# Patient Record
Sex: MALE | Race: WHITE
[De-identification: names, ages, dates, MRNs, and addresses within clinical notes are randomized per-mention and may not be internally consistent; named-entity substitution may affect disease eponyms.]

---

## 2017-07-22 ENCOUNTER — HOSPITAL ENCOUNTER (EMERGENCY)
Dept: HOSPITAL 18 - NAV ERS | Age: 52
Discharge: HOME | End: 2017-07-22
Payer: COMMERCIAL

## 2017-07-22 DIAGNOSIS — Z79.4: ICD-10-CM

## 2017-07-22 DIAGNOSIS — S70.11XA: ICD-10-CM

## 2017-07-22 DIAGNOSIS — F17.210: ICD-10-CM

## 2017-07-22 DIAGNOSIS — E11.9: ICD-10-CM
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
## 2017-07-22 DIAGNOSIS — S23.41XA: Primary | ICD-10-CM

## 2017-07-22 DIAGNOSIS — Z79.899: ICD-10-CM

## 2017-07-22 LAB
ALBUMIN SERPL BCG-MCNC: 4.6 G/DL (ref 3.5–5)
ALP SERPL-CCNC: 53 U/L (ref 40–150)
ALT SERPL W P-5'-P-CCNC: 37 U/L (ref 8–55)
ANION GAP SERPL CALC-SCNC: 17 MMOL/L (ref 10–20)
AST SERPL-CCNC: 26 U/L (ref 5–34)
BASOPHILS # BLD AUTO: 0.1 THOU/UL (ref 0–0.2)
BASOPHILS NFR BLD AUTO: 1 % (ref 0–1)
BILIRUB SERPL-MCNC: 0.4 MG/DL (ref 0.2–1.2)
BUN SERPL-MCNC: 11 MG/DL (ref 8.4–25.7)
CALCIUM SERPL-MCNC: 9.8 MG/DL (ref 7.8–10.44)
CHLORIDE SERPL-SCNC: 104 MMOL/L (ref 98–107)
CO2 SERPL-SCNC: 21 MMOL/L (ref 22–29)
CREAT CL PREDICTED SERPL C-G-VRATE: 0 ML/MIN (ref 70–130)
EOSINOPHIL # BLD AUTO: 0.1 THOU/UL (ref 0–0.7)
EOSINOPHIL NFR BLD AUTO: 0.8 % (ref 0–10)
GLOBULIN SER CALC-MCNC: 2.7 G/DL (ref 2.4–3.5)
GLUCOSE SERPL-MCNC: 169 MG/DL (ref 70–105)
HGB BLD-MCNC: 14.9 G/DL (ref 14–18)
LYMPHOCYTES # BLD AUTO: 3.1 THOU/UL (ref 1.2–3.4)
LYMPHOCYTES NFR BLD AUTO: 30.3 % (ref 21–51)
MCH RBC QN AUTO: 30.9 PG (ref 27–31)
MCV RBC AUTO: 90.2 FL (ref 80–94)
MONOCYTES # BLD AUTO: 0.8 THOU/UL (ref 0.11–0.59)
MONOCYTES NFR BLD AUTO: 7.3 % (ref 0–10)
NEUTROPHILS # BLD AUTO: 6.3 THOU/UL (ref 1.4–6.5)
NEUTROPHILS NFR BLD AUTO: 60.6 % (ref 42–75)
PLATELET # BLD AUTO: 271 THOU/UL (ref 130–400)
POTASSIUM SERPL-SCNC: 4 MMOL/L (ref 3.5–5.1)
RBC # BLD AUTO: 4.81 MILL/UL (ref 4.7–6.1)
SODIUM SERPL-SCNC: 138 MMOL/L (ref 136–145)
WBC # BLD AUTO: 10.3 THOU/UL (ref 4.8–10.8)

## 2017-07-22 PROCEDURE — 96374 THER/PROPH/DIAG INJ IV PUSH: CPT

## 2017-07-22 PROCEDURE — 96375 TX/PRO/DX INJ NEW DRUG ADDON: CPT

## 2017-07-22 PROCEDURE — 80053 COMPREHEN METABOLIC PANEL: CPT

## 2017-07-22 PROCEDURE — 85025 COMPLETE CBC W/AUTO DIFF WBC: CPT

## 2017-07-22 PROCEDURE — 74177 CT ABD & PELVIS W/CONTRAST: CPT

## 2017-07-22 PROCEDURE — 71260 CT THORAX DX C+: CPT

## 2017-07-22 NOTE — RAD
RIGHT FEMUR TWO VIEWS

7/22/17

 

HISTORY: 

Fall from ladder. Right leg injury. 

 

FINDINGS:  

Tiny well corticated ossification just anterior to the greater trochanter on the lateral view may re
present an old ossific avulsion or heterotopic ossification. There are mild degenerative changes of 
the hip. No acute fracture or dislocation are apparent. 

 

IMPRESSION:  

No acute osseous abnormalities are demonstrated. 

 

POS: RENARD

## 2017-07-22 NOTE — CT
CT CHEST WITH IV CONTRAST

CT ABDOMEN AND PELVIS WITH IV CONTRAST 

CT THORACIC SPINE NONCONTRAST

CT LUMBAR SPINE NONCONTRAST

7/22/17

 

HISTORY: 

Fall from ladder. Chest injury. Abdomen injury. Back injury.

 

FINDINGS:  

There is no evidence of pneumothorax, pleural fluid or mediastinal hematoma. Calcifications apparent
 within the coronary arteries. The liver, spleen, right kidney, adrenal glands, and pancreas are wit
hin normal limits. Large cysts projecting anteriorly from the superior pole of the left kidney. Post
operative changes of the sigmoid colon are apparent. Minimal physiologic wedging of the L1 vertebral
 body has the appearance of an old process. Vertebral body height and alignment otherwise are mainta
ined with scattered osteophytosis. No acute fracture or dislocation are evident. 

 

IMPRESSION:  

1.      No acute traumatic injury is demonstrated. 

2.      Atherosclerosis. 

 

POS: RENARD

## 2018-10-07 ENCOUNTER — HOSPITAL ENCOUNTER (EMERGENCY)
Dept: HOSPITAL 18 - NAV ERS | Age: 53
Discharge: ANOTHER HOSPITAL | End: 2018-10-07
Payer: COMMERCIAL

## 2018-10-07 ENCOUNTER — HOSPITAL ENCOUNTER (INPATIENT)
Dept: HOSPITAL 92 - ERS | Age: 53
LOS: 2 days | Discharge: HOME | DRG: 193 | End: 2018-10-09
Attending: INTERNAL MEDICINE | Admitting: INTERNAL MEDICINE
Payer: COMMERCIAL

## 2018-10-07 VITALS — BODY MASS INDEX: 29 KG/M2

## 2018-10-07 DIAGNOSIS — Z79.4: ICD-10-CM

## 2018-10-07 DIAGNOSIS — E11.9: ICD-10-CM

## 2018-10-07 DIAGNOSIS — J96.01: ICD-10-CM

## 2018-10-07 DIAGNOSIS — J10.1: Primary | ICD-10-CM

## 2018-10-07 DIAGNOSIS — J09.X2: Primary | ICD-10-CM

## 2018-10-07 DIAGNOSIS — J44.1: ICD-10-CM

## 2018-10-07 DIAGNOSIS — I10: ICD-10-CM

## 2018-10-07 DIAGNOSIS — K58.9: ICD-10-CM

## 2018-10-07 DIAGNOSIS — Z79.899: ICD-10-CM

## 2018-10-07 DIAGNOSIS — F17.210: ICD-10-CM

## 2018-10-07 LAB
ALBUMIN SERPL BCG-MCNC: 4.7 G/DL (ref 3.5–5)
ALP SERPL-CCNC: 52 U/L (ref 40–150)
ALT SERPL W P-5'-P-CCNC: 19 U/L (ref 8–55)
ANION GAP SERPL CALC-SCNC: 18 MMOL/L (ref 10–20)
AST SERPL-CCNC: 20 U/L (ref 5–34)
BASOPHILS # BLD AUTO: 0.1 THOU/UL (ref 0–0.2)
BASOPHILS NFR BLD AUTO: 0.8 % (ref 0–1)
BILIRUB SERPL-MCNC: 0.6 MG/DL (ref 0.2–1.2)
BUN SERPL-MCNC: 11 MG/DL (ref 8.4–25.7)
CALCIUM SERPL-MCNC: 9.6 MG/DL (ref 7.8–10.44)
CHLORIDE SERPL-SCNC: 103 MMOL/L (ref 98–107)
CK MB SERPL-MCNC: 3.9 NG/ML (ref 0–6.6)
CO2 SERPL-SCNC: 19 MMOL/L (ref 22–29)
CREAT CL PREDICTED SERPL C-G-VRATE: 0 ML/MIN (ref 70–130)
EOSINOPHIL # BLD AUTO: 0 THOU/UL (ref 0–0.7)
EOSINOPHIL NFR BLD AUTO: 0.2 % (ref 0–10)
GLOBULIN SER CALC-MCNC: 2.9 G/DL (ref 2.4–3.5)
GLUCOSE SERPL-MCNC: 143 MG/DL (ref 70–105)
HGB BLD-MCNC: 16.4 G/DL (ref 14–18)
LYMPHOCYTES # BLD AUTO: 1.7 THOU/UL (ref 1.2–3.4)
LYMPHOCYTES NFR BLD AUTO: 13.6 % (ref 21–51)
MCH RBC QN AUTO: 29.8 PG (ref 27–31)
MCV RBC AUTO: 92.3 FL (ref 78–98)
MONOCYTES # BLD AUTO: 0.8 THOU/UL (ref 0.11–0.59)
MONOCYTES NFR BLD AUTO: 6.5 % (ref 0–10)
NEUTROPHILS # BLD AUTO: 10 THOU/UL (ref 1.4–6.5)
NEUTROPHILS NFR BLD AUTO: 78.9 % (ref 42–75)
PLATELET # BLD AUTO: 301 THOU/UL (ref 130–400)
POTASSIUM SERPL-SCNC: 3.7 MMOL/L (ref 3.5–5.1)
RBC # BLD AUTO: 5.5 MILL/UL (ref 4.7–6.1)
SODIUM SERPL-SCNC: 136 MMOL/L (ref 136–145)
TROPONIN I SERPL DL<=0.01 NG/ML-MCNC: (no result) NG/ML (ref ?–0.03)
WBC # BLD AUTO: 12.7 THOU/UL (ref 4.8–10.8)

## 2018-10-07 PROCEDURE — 36416 COLLJ CAPILLARY BLOOD SPEC: CPT

## 2018-10-07 PROCEDURE — 87804 INFLUENZA ASSAY W/OPTIC: CPT

## 2018-10-07 PROCEDURE — 80053 COMPREHEN METABOLIC PANEL: CPT

## 2018-10-07 PROCEDURE — 83880 ASSAY OF NATRIURETIC PEPTIDE: CPT

## 2018-10-07 PROCEDURE — 71275 CT ANGIOGRAPHY CHEST: CPT

## 2018-10-07 PROCEDURE — 85379 FIBRIN DEGRADATION QUANT: CPT

## 2018-10-07 PROCEDURE — 85025 COMPLETE CBC W/AUTO DIFF WBC: CPT

## 2018-10-07 PROCEDURE — 96374 THER/PROPH/DIAG INJ IV PUSH: CPT

## 2018-10-07 PROCEDURE — 83605 ASSAY OF LACTIC ACID: CPT

## 2018-10-07 PROCEDURE — 87040 BLOOD CULTURE FOR BACTERIA: CPT

## 2018-10-07 PROCEDURE — 94760 N-INVAS EAR/PLS OXIMETRY 1: CPT

## 2018-10-07 PROCEDURE — 94640 AIRWAY INHALATION TREATMENT: CPT

## 2018-10-07 PROCEDURE — 82553 CREATINE MB FRACTION: CPT

## 2018-10-07 PROCEDURE — 93005 ELECTROCARDIOGRAM TRACING: CPT

## 2018-10-07 PROCEDURE — 96361 HYDRATE IV INFUSION ADD-ON: CPT

## 2018-10-07 PROCEDURE — A4216 STERILE WATER/SALINE, 10 ML: HCPCS

## 2018-10-07 PROCEDURE — 84484 ASSAY OF TROPONIN QUANT: CPT

## 2018-10-07 PROCEDURE — 99406 BEHAV CHNG SMOKING 3-10 MIN: CPT

## 2018-10-07 NOTE — CT
CT ANGIOGRAM CHEST:

 

10/07/2018 

 

HISTORY:

Shortness of breath.  Assess for pulmonary embolism.  Coughing.

 

COMPARISON:

None.

 

TECHNIQUE:

Serial axial CT imaging at 2.5 mm intervals, from the thoracic inlet through the upper abdomen, with 
IV contrast, using a CT angiogram protocol.  Coronal and oblique sagittal 3D reformatted imaging obta
ined.

 

FINDINGS:

No axillary, mediastinal, or hilar lymphadenopathy is noted.  No pleural, pericardial, or mediastinal
 fluid is seen.

 

The imaged upper abdomen demonstrates a low density, incompletely imaged lesion, emanating from the u
pper pole of the left kidney, measuring 6 cm.  Recommend nonemergent follow-up renal ultrasound for f
ull assessment.

 

There are a few scattered atherosclerotic calcifications of the coronary arteries.

 

There is no central pulmonary arterial filling defect to suggest the presence of central pulmonary em
bolism.  Motion artifact limits detailed assessment of the distal pulmonary arteries.

 

There are scattered central lobular emphysematous changes noted within both lungs.

 

There are a few scattered, nonspecific, subcentimeter, pulmonary nodules seen within the right upper 
lobe, all measuring in the 3-4 mm range, including on image 26, 32, 34, and 35.  Small nodules are se
en along the course of the minor fissure, measuring up to 5 mm as well.  There is mild volume loss wi
thin the inferomedial aspect of the right middle lobe.

 

Subcentimeter pulmonary nodule noted in the left upper lobe anterolaterally, on image 39, measuring 4
 mm.  Similar nodule seen in the left upper lobe on image 43.

 

Review of the osseous structures demonstrates no worrisome lytic or blastic bone lesion.

 

There is a pulmonary nodule within the medial aspect of the left upper lobe on image 25, measuring 4 
mm.  Of note, these pulmonary nodules appear new when compared to a 07/22/2017 CT examination of the 
chest.

 

IMPRESSION:

1.  No evidence for pulmonary embolism.

 

2.  Nonspecific, small, new pulmonary nodules are noted bilaterally.  Recommend follow-up CT examinat
ion of the chest in 3-6 months.

 

3.  Hypodense lesion emanating from the left kidney, incompletely assessed.  Recommend further evalua
tion via nonemergent renal ultrasound.

 

CODE T

 

POS: RENARD

## 2018-10-08 RX ADMIN — INSULIN GLARGINE SCH MLS: 100 INJECTION, SOLUTION SUBCUTANEOUS at 10:10

## 2018-10-08 RX ADMIN — INSULIN LISPRO PRN UNIT: 100 INJECTION, SOLUTION INTRAVENOUS; SUBCUTANEOUS at 12:42

## 2018-10-08 RX ADMIN — INSULIN LISPRO PRN UNIT: 100 INJECTION, SOLUTION INTRAVENOUS; SUBCUTANEOUS at 16:38

## 2018-10-08 RX ADMIN — Medication SCH: at 08:33

## 2018-10-08 RX ADMIN — Medication SCH ML: at 20:36

## 2018-10-08 RX ADMIN — INSULIN HUMAN PRN UNIT: 100 INJECTION, SOLUTION PARENTERAL at 05:06

## 2018-10-08 RX ADMIN — INSULIN HUMAN PRN UNIT: 100 INJECTION, SOLUTION PARENTERAL at 20:39

## 2018-10-08 NOTE — PDOC.FPRHP
Addendum entered and electronically signed by Antoine Cary MD  10/08/18 03:34

: 





Home Medications


1. Proair 108 2 puff q4hr PRN


2. lantus 70 unit qam and 10 unit 1HS


3. lisinoprial 2.5 mg daily


4 hyoscyamine 0.125 mg q6hr PRN


5. Trulicirty 1.5 mg qweek


6. Viagria 100 mg 1 tab PRN


7. Combivent  mcg 2 puff q4hr PRN


8. humalog 5 unit SC QAC





Original Note:








- History of Present Illness


Chief Complaint: SOB


History of Present Illness: 





This is a 53 yo male with a PMH of DM II, IBS, COPD who presents to the ED from 

[place] with a CC of SOB. Pt. States that the symptoms began about 1 day PTA. 

Pt. Reports an associated cough that started dry and became productive after 

breathing treatment. He states he has never had anything like this before. He 

denies fevers or chills. He did report some soreness in his right shoulder that 

he states is from coughing.





- Allergies/Adverse Reactions


 Allergies











Allergy/AdvReac Type Severity Reaction Status Date / Time


 


No Known Drug Allergies Allergy Mild  Verified 10/08/18 03:43














- Home Medications


 











 Medication  Instructions  Recorded  Confirmed  Type


 


Dulaglutide [Trulicity] 1.5 mg SC Q7D 10/08/18 10/08/18 History


 


HumaLOG 5 unit SC AC PRN 10/08/18 10/08/18 History


 


Hyoscyamine Sulfate [Levsin SL] 0.25 mg SL Q6HR PRN 10/08/18 10/08/18 History


 


Insulin Glargine,Hum.Rec.Anlog 10 unit SQ QPM 10/08/18 10/08/18 History





[Lantus Solostar]    


 


Insulin Glargine,Hum.Rec.Anlog 70 unit SQ QAM 10/08/18 10/08/18 History





[Lantus Solostar]    


 


Lisinopril 2.5 mg PO DAILY 10/08/18 10/08/18 History














- History


PMHx:HTN, COPD, DMII, IBS


 


PSHx: Colectomy 2/2 diverticulitis, hiatal hernia surgery, ventral hernia repair





FHx: Mother COPD, father lung cancer 2/2 asbestos


 


Social: 40 pack year history, a few drinks 2-3 times a week, no drugs


 








- Review of Systems


General: denies: fever/chills, weight/appetite/sleep changes


Eyes: denies: eye pain, vision changes


ENT: denies: nasal congestion, rhinorrhea


Respiratory: reports: cough, shortness of breath.  denies: congestion


Cardiovascular: denies: chest pain, palpitation, edema


Gastrointestinal: denies: nausea, vomiting, diarrhea, constipation, abdominal 

pain


Genitourinary: denies: incontinence, dysuria


Skin: denies: rashes, lesions


Musculoskeletal: reports: pain (Shoulder pain)


Neurological: denies: numbness, syncope, seizure


Psychological: denies: anxiety, depression





- Vital signs


BP: 127/76  HR: 110 RR: 18 Tmax: 98.4 Pox: 93% on 2L  Wt: 88 kg   








- Physical Exam


Constitutional: NAD, awake, alert and oriented, well developed


HEENT: PERRLA, EOMI, MMM


Neck: supple, FROM, no JVD


Chest: no-tender to palpation, no lesions


Heart: RRR, normal S1/S2, no murmurs/rubs/gallops


Lungs: CTAB, no respiratory distress, good air movement, no wheezing


Abdomen: soft, non-tender, bowel sounds present, no masses/distention


Musculoskeletal: normal structure, normal tone, ROM grossly normal


Neurological: CN II-XII intact


Skin: good turgor, capillary refill <2 seconds


Heme/Lymphatic: no unusual bruising or bleeding


Psychiatric: normal mood and affect





FMR H&P: Results





- Labs


Lab results: 





Sodium 136, potassium 3.7, Chloride 103, bicarb 19, bun 11, cr 0.89, glucose 143

, lactic acid 1.8, Trop neg x1, BNP <10, WBC 12.7, Hgb 16.4, Hct 50.8, ptl 301, 

Influenza A positive.





- Radiology Interpretation


  ** CT scan - chest


Status: report reviewed by me (CTA: no PE, new, small pulmonary nodules 

bilaterally, hypodesne lestion emanating from left kidney)





FMR H&P: A/P





- Problem List


(1) Acute respiratory failure with hypoxia


Current Visit: Yes   Status: Acute   Code(s): J96.01 - ACUTE RESPIRATORY 

FAILURE WITH HYPOXIA   





(2) COPD exacerbation


Current Visit: Yes   Status: Acute   Code(s): J44.1 - CHRONIC OBSTRUCTIVE 

PULMONARY DISEASE W (ACUTE) EXACERBATION   





(3) DMII (diabetes mellitus, type 2)


Current Visit: Yes   Status: Acute   





(4) HTN (hypertension)


Current Visit: Yes   Status: Acute   Code(s): I10 - ESSENTIAL (PRIMARY) 

HYPERTENSION   





(5) IBS (irritable bowel syndrome)


Current Visit: Yes   Status: Acute   





- Plan





This is a 53 yo male with a PMH of DM II, IBS, COPD 





Acute hypoxic respiratory failure 2/2 COPD exacerbation 


-Pt. Is admitted to medical. We will provide supportive care and duonebs q4hr, 

abuterol q2hr PRN while pt. Is here. Pt has been started on oral steroids(10/8) 

as well as azithromycin (10/8). Flu swab was positive. We will start him on 

tamiflu and respiratory precautions 





DMII 


-Continue home meds. ACHS accuchecks, SSI 





HTN 


-Continue home meds 





IBS 


-Aware 





Code: Full 


Prophylaxis: lovenox 


Family: none at bedside 


Disposition: Home in 2-3 days 





FMR H&P: Upper Level





- Pertinent history





53 yo M PMH HTN, IDDM, COPD, and HLD. Presents as transfer from [place] ER with 

a CC of worsening SOB and CHILDERS that started approximately 24 hours ago. Reports 

worsening of symptoms. Also reports left sided shoulder pain that started at 

1300 today. Endorses cough. States neb treatments have help with symptoms but 

was still requiring oxygen. Was sent to [facility] for evaluation and admission. 





[place] ER: Labs, CTA-PE protocol, EKG, Solumedrol, duobneb x2, albuterol x1, NS 

1L. 


[place] [facility]: Duoneb x1, Tamiflu, albuterol x1, 











- Pertinent findings


Vitals: Remarkable for spo2 93% on 2L NC, otherwise WNL


GEN: mild respiratory distress with movement, speaks in full sentences, A&Ox4


CV: RRR, no murmur


Pulm: CTA-B, increased labor of breathing with minimal exertion


Skin: warm, dry and intact.





Labs: WBC 12.7, D-dimer 0.7, influenza A positive. 





Imaging: CTA-chest- negative PE, no infiltrates, pulmonary nodules, 





- Plan


Date/Time: 10/08/18 0209








I, Antoine Cary MD, have evaluated this patient and agree with findings/plan as 

outlined by intern resident. Pertinent changes/additions are listed here.





1. Acute Hypoxic respiratory distress 2/2 influenza A pneumonia vs COPD 

exacerbation: continue tamiflu, prednisone, scheduled Duoneb with PRN albuterol 

Neb, and azithromycin. oxygen as need to maintain saturation >92%. 


2. HTN: home meds


3. COPD: home meds


4. IDDM: home meds, SSI, ACHS checks, hypoglycemic protocol 


5. HLD: niranjan meds


6. Pulmonary nodule: outpatient CT follow up


7. Tobacco abuse: prn patch, cessation encouraged 


8. Diet: CC, HH,


9. PPx: SCD


10. CODE: FULL





Dispo: inpatient, medical, >2 midnights 





Discussed with Dr. Blanco. 





Attending Addendum





- Attending Addendum


Date/Time: 10/08/18 1530





I personally evaluated the patient and discussed the management with Dr. Hernandez


I agree with the History, Examination, Assessment and Plan documented above 

with any addition or exceptions noted below- Briefly this is a 53 yo male with h

/o COPD, DM, HTN who presented with 1 day h/o SOB worsening throughout the day. 

Mild nonproductive cough. Denies fever, sore throat, body aches. Wife just 

started to feel ill also. PMH/PSH/Meds/SH reviewed and agree with resident's 

documentation. Afebrile VSS. Exam repeated by me and agree with resident's 

findings. Labs: WBC=12.7, H/H=16.4/50.8, Qrh=628, ZG=278, K=3.7, Ya=843, CO2=19

, BUN/Cr=11/0.89, Gluc=143 CTA- negative for PE. Influenza A swab- positive.  A/

P: 1) COPD exacerbation- continue nebs, O2, steroids. 2) Influenza A- started 

on tamiflu, 3) DM- continue home medications

## 2018-10-09 VITALS — SYSTOLIC BLOOD PRESSURE: 104 MMHG | DIASTOLIC BLOOD PRESSURE: 69 MMHG | TEMPERATURE: 98.2 F

## 2018-10-09 RX ADMIN — INSULIN HUMAN PRN UNIT: 100 INJECTION, SOLUTION PARENTERAL at 05:17

## 2018-10-09 RX ADMIN — Medication SCH ML: at 08:36

## 2018-10-09 RX ADMIN — INSULIN HUMAN PRN UNIT: 100 INJECTION, SOLUTION PARENTERAL at 12:20

## 2018-10-09 RX ADMIN — INSULIN GLARGINE SCH MLS: 100 INJECTION, SOLUTION SUBCUTANEOUS at 08:37

## 2018-10-09 NOTE — PDOC.FM
- Subjective


Subjective: 





Mr. Lambert is resting comfortably in bed. He continues to complain of dyspnea 

especially with ambulation. Denies SOB, body aches, chills or fever. He is 

eating and drinking well.





- Objective


Vital Signs & Weight: 


 Vital Signs (12 hours)











  Temp Pulse Resp BP Pulse Ox


 


 10/09/18 04:24  97.9 F  95  18  116/74  94 L


 


 10/09/18 02:05   95  16   94 L


 


 10/09/18 00:00  98.1 F  101 H  16  109/69  92 L


 


 10/08/18 22:42   87  18   94 L


 


 10/08/18 20:00      94 L


 


 10/08/18 19:00  98.1 F  97  18  111/72  96








 Weight











Weight                         89.2 kg














I&O: 


 











 10/07/18 10/08/18 10/09/18





 06:59 06:59 06:59


 


Intake Total  520 


 


Balance  520 














<Raheem Coon - Last Filed: 10/09/18 09:20>





- Objective


Vital Signs & Weight: 


 Vital Signs (12 hours)











  Temp Pulse Resp BP Pulse Ox


 


 10/09/18 15:03  98.2 F  95  20  104/69  95


 


 10/09/18 14:45   93  16   95


 


 10/09/18 10:41   99  16   95








 Weight











Weight                         89.2 kg














I&O: 


 











 10/08/18 10/09/18 10/10/18





 06:59 06:59 06:59


 


Intake Total 520  800


 


Balance 520  800














<Jaqui Blanco - Last Filed: 10/09/18 21:44>





Phys Exam





- Physical Examination


Constitutional: NAD


HEENT: moist MMs


Respiratory: no wheezing, no rales, no rhonchi, clear to auscultation bilateral


Cardiovascular: RRR, no significant murmur, no rub


Gastrointestinal: soft, non-tender


Musculoskeletal: no edema, pulses present


Neurological: non-focal, moves all 4 limbs


Psychiatric: normal affect


Skin: no rash





<Raheem Coon - Last Filed: 10/09/18 09:20>





Dx/Plan


(1) Influenza A


Code(s): J10.1 - FLU DUE TO OTH IDENT INFLUENZA VIRUS W OTH RESP MANIFEST   

Status: Acute   





(2) COPD exacerbation


Code(s): J44.1 - CHRONIC OBSTRUCTIVE PULMONARY DISEASE W (ACUTE) EXACERBATION   

Status: Acute   





(3) DMII (diabetes mellitus, type 2)


Status: Acute   





(4) HTN (hypertension)


Code(s): I10 - ESSENTIAL (PRIMARY) HYPERTENSION   Status: Acute   





- Plan


Plan: 





Acute hypoxic respiratory failure 2/2 COPD exacerbation 


-supportive care and duonebs q4hr, abuterol q2hr PRN 


- oral steroids(10/8) as well as azithromycin (10/8).


- Flu swab positive, tamiflu and respiratory precautions 





DMII 


-Continue home meds. ACHS accuchecks, SSI \


- POC glucose down trending 





HTN 


-Continue home meds 





IBS 


-Aware 





Code: Full 


Prophylaxis: lovenox 





Dispo: continue supportive care, possible DC today or tomorrow 





<Raheem Coon - Last Filed: 10/09/18 09:20>


(1) Acute respiratory failure with hypoxia


Code(s): J96.01 - ACUTE RESPIRATORY FAILURE WITH HYPOXIA   Status: Acute   





(2) COPD exacerbation


Code(s): J44.1 - CHRONIC OBSTRUCTIVE PULMONARY DISEASE W (ACUTE) EXACERBATION   

Status: Acute   





(3) DMII (diabetes mellitus, type 2)


Status: Acute   





(4) HTN (hypertension)


Code(s): I10 - ESSENTIAL (PRIMARY) HYPERTENSION   Status: Acute   





(5) IBS (irritable bowel syndrome)


Status: Acute   





<Jaqui Blanco - Last Filed: 10/09/18 21:44>





Attending Addendum





- Attending Addendum


Date/Time: 10/09/18 2140





I personally evaluated the patient and discussed the management with Dr. Coon


I agree with the History, Examination, Assessment and Plan documented above 

with any addition or exceptions noted below- Patient feelingbetter. Decreased 

SOB? Able to ambulate more easily. Afebrile VSS. A/P: 1) COPD exacerbation- 

improved. Continue to monitor through today if remains stable, d/c home this 

afternoon. Continue nebs, steroids, Start maintenance inhaler. 








<Jaqui Blanco - Last Filed: 10/09/18 21:44>

## 2018-10-10 NOTE — DIS-2
RESIDENT:  Raheem Coon DO

 

ADMITTING ATTENDING:  Jaqui Blanco MD

 

DISCHARGE ATTENDING:  Luis Pritchett MD

 

CONSULTATIONS:  None.

 

PROCEDURES:  None.

 

PRIMARY DIAGNOSIS:  Influenza A.

 

SECONDARY DIAGNOSES:

1.  Chronic obstructive pulmonary disease exacerbation.

2.  Diabetes mellitus type 2.

3.  Hypertension.

4.  Irritable bowel syndrome.

 

DISPOSITION:  Stable.

 

DISCHARGE MEDICATIONS:

1.  Insulin glargine 70 units subcutaneously q.a.m.

2.  Humalog 5 units SC before meals and p.r.n.

3.  Hyoscyamine sulfate 0.25 mg SL q.6 hours p.r.n.

4.  Trulicity 1.5 mg SC q.7 days.

5.  Lisinopril 2.5 mg p.o. daily.

6.  Insulin 10 units SQ q.p.m.

7.  Azithromycin 250 mg p.o. daily for 4 days.

8.  Dulera 2 puffs inhaled b.i.d., 30 days.

9.  Prednisone 40 mg p.o. daily for 4 days.

10.  Tamiflu 75 mg p.o. b.i.d. for 4 days.

 

DISCONTINUED MEDICATIONS:  None.

 

HISTORY OF PRESENT ILLNESS AND HOSPITAL COURSE:  This is a 52-year-old male with a past medical histo
ry of diabetes mellitus type 2, irritable bowel syndrome, COPD who presented to the ED from [facility] complaints of shortness of breath.  These symptoms began about one day before he got there.  He re
ports a cough that initially was dry, but became productive.  He has been doing breathing treatments 
at home and reports never having anything like this before.  He has received his flu shot.  He denied
 fever or chills at that time, and he did report some soreness in his shoulder.  In the ED, he was gi
sheryl Tamiflu, Levaquin, and DuoNeb with improvement of his cough.  He was then transferred from the [facility] ED to [place], where he was tested for influenza and tested positive for influenza A.  He was
 admitted to the hospital at that point.  Home medications were continued.  Glucose was elevated at t
hat time and controlled with moderate sliding scale.  The patient was continued on Tamiflu and starte
d on azithromycin, scheduled DuoNeb, and prednisone for COPD exacerbation.  The patient improved duri
ng his hospital course with improvement of dyspnea and cough.

 

DISCHARGE INSTRUCTIONS:

1.  Diet:  Diabetic.

2.  Activity:  As tolerated.

3.  Follow up with PCP, Dr. Francis, in 1 week to evaluate further need for long-term COPD medication
s.

## 2022-12-15 ENCOUNTER — HOSPITAL ENCOUNTER (OUTPATIENT)
Dept: HOSPITAL 92 - RAD | Age: 57
Discharge: HOME | End: 2022-12-15
Attending: NEUROLOGICAL SURGERY
Payer: COMMERCIAL

## 2022-12-15 VITALS — SYSTOLIC BLOOD PRESSURE: 109 MMHG | TEMPERATURE: 97.6 F | DIASTOLIC BLOOD PRESSURE: 71 MMHG

## 2022-12-15 VITALS — BODY MASS INDEX: 29.5 KG/M2

## 2022-12-15 DIAGNOSIS — M47.12: Primary | ICD-10-CM

## 2022-12-15 DIAGNOSIS — Z79.899: ICD-10-CM

## 2022-12-15 DIAGNOSIS — Z79.84: ICD-10-CM

## 2022-12-15 DIAGNOSIS — M40.202: ICD-10-CM

## 2022-12-15 DIAGNOSIS — Z79.4: ICD-10-CM

## 2022-12-15 DIAGNOSIS — M43.12: ICD-10-CM

## 2022-12-15 DIAGNOSIS — M48.02: ICD-10-CM

## 2022-12-15 PROCEDURE — 62302 MYELOGRAPHY LUMBAR INJECTION: CPT

## 2022-12-15 PROCEDURE — B01B1ZZ FLUOROSCOPY OF SPINAL CORD USING LOW OSMOLAR CONTRAST: ICD-10-PCS

## 2022-12-15 PROCEDURE — 72126 CT NECK SPINE W/DYE: CPT

## 2023-04-03 ENCOUNTER — HOSPITAL ENCOUNTER (OUTPATIENT)
Dept: HOSPITAL 92 - TBSIIMAG | Age: 58
Discharge: HOME | End: 2023-04-03
Attending: NEUROLOGICAL SURGERY
Payer: COMMERCIAL

## 2023-04-03 DIAGNOSIS — M47.22: Primary | ICD-10-CM

## 2023-04-03 DIAGNOSIS — Z98.1: ICD-10-CM

## 2023-04-03 PROCEDURE — 72040 X-RAY EXAM NECK SPINE 2-3 VW: CPT

## 2023-05-24 ENCOUNTER — HOSPITAL ENCOUNTER (OUTPATIENT)
Dept: HOSPITAL 18 - NAV RAD | Age: 58
Discharge: HOME | End: 2023-05-24
Payer: COMMERCIAL

## 2023-05-24 DIAGNOSIS — M54.2: Primary | ICD-10-CM

## 2023-05-24 DIAGNOSIS — Z98.1: ICD-10-CM

## 2023-05-24 PROCEDURE — 72040 X-RAY EXAM NECK SPINE 2-3 VW: CPT

## 2023-10-05 ENCOUNTER — HOSPITAL ENCOUNTER (OUTPATIENT)
Dept: HOSPITAL 92 - CSHRAD | Age: 58
Discharge: HOME | End: 2023-10-05
Attending: TRANSPLANT SURGERY
Payer: COMMERCIAL

## 2023-10-05 DIAGNOSIS — Z98.890: ICD-10-CM

## 2023-10-05 DIAGNOSIS — M54.2: Primary | ICD-10-CM

## 2023-10-05 DIAGNOSIS — M47.812: ICD-10-CM

## 2023-10-05 PROCEDURE — 72040 X-RAY EXAM NECK SPINE 2-3 VW: CPT

## 2023-10-23 ENCOUNTER — HOSPITAL ENCOUNTER (OUTPATIENT)
Dept: HOSPITAL 92 - CSHRAD | Age: 58
Discharge: HOME | End: 2023-10-23
Attending: NEUROLOGICAL SURGERY
Payer: COMMERCIAL

## 2023-10-23 VITALS — DIASTOLIC BLOOD PRESSURE: 73 MMHG | SYSTOLIC BLOOD PRESSURE: 109 MMHG | TEMPERATURE: 97.6 F

## 2023-10-23 DIAGNOSIS — M47.12: Primary | ICD-10-CM

## 2023-10-23 PROCEDURE — 72126 CT NECK SPINE W/DYE: CPT

## 2023-10-23 PROCEDURE — B02BYZZ COMPUTERIZED TOMOGRAPHY (CT SCAN) OF SPINAL CORD USING OTHER CONTRAST: ICD-10-PCS | Performed by: RADIOLOGY

## 2023-10-23 PROCEDURE — 62302 MYELOGRAPHY LUMBAR INJECTION: CPT
